# Patient Record
Sex: MALE | Race: WHITE | NOT HISPANIC OR LATINO | Employment: FULL TIME | URBAN - METROPOLITAN AREA
[De-identification: names, ages, dates, MRNs, and addresses within clinical notes are randomized per-mention and may not be internally consistent; named-entity substitution may affect disease eponyms.]

---

## 2019-01-16 ENCOUNTER — OFFICE VISIT CONVERTED (OUTPATIENT)
Dept: SURGERY | Facility: CLINIC | Age: 54
End: 2019-01-16
Attending: SURGERY

## 2019-01-18 ENCOUNTER — HOSPITAL ENCOUNTER (OUTPATIENT)
Dept: PERIOP | Facility: HOSPITAL | Age: 54
Setting detail: HOSPITAL OUTPATIENT SURGERY
Discharge: HOME | End: 2019-01-18
Attending: SURGERY

## 2019-01-31 ENCOUNTER — OFFICE VISIT CONVERTED (OUTPATIENT)
Dept: SURGERY | Facility: CLINIC | Age: 54
End: 2019-01-31
Attending: SURGERY

## 2019-05-18 ENCOUNTER — HOSPITAL ENCOUNTER (OUTPATIENT)
Dept: URGENT CARE | Facility: CLINIC | Age: 54
Discharge: HOME | End: 2019-05-18

## 2021-04-09 ENCOUNTER — HOSPITAL ENCOUNTER (OUTPATIENT)
Dept: GENERAL RADIOLOGY | Facility: HOSPITAL | Age: 56
Discharge: HOME | End: 2021-04-09
Attending: NURSE PRACTITIONER

## 2021-05-16 VITALS — RESPIRATION RATE: 14 BRPM | BODY MASS INDEX: 30.31 KG/M2 | WEIGHT: 200 LBS | HEIGHT: 68 IN

## 2021-05-16 VITALS — HEIGHT: 68 IN | RESPIRATION RATE: 16 BRPM | BODY MASS INDEX: 30.24 KG/M2 | WEIGHT: 199.56 LBS

## 2021-07-01 ENCOUNTER — HOSPITAL ENCOUNTER (OUTPATIENT)
Dept: GENERAL RADIOLOGY | Facility: HOSPITAL | Age: 56
Discharge: HOME | End: 2021-07-01
Admitting: NURSE PRACTITIONER

## 2021-07-01 ENCOUNTER — TRANSCRIBE ORDERS (OUTPATIENT)
Dept: GENERAL RADIOLOGY | Facility: HOSPITAL | Age: 56
End: 2021-07-01

## 2021-07-01 DIAGNOSIS — R10.9 STOMACH ACHE: ICD-10-CM

## 2021-07-01 DIAGNOSIS — R10.9 STOMACH ACHE: Primary | ICD-10-CM

## 2021-07-01 PROCEDURE — 74018 RADEX ABDOMEN 1 VIEW: CPT

## 2021-08-21 ENCOUNTER — APPOINTMENT (OUTPATIENT)
Dept: CT IMAGING | Facility: HOSPITAL | Age: 56
End: 2021-08-21

## 2021-08-21 ENCOUNTER — HOSPITAL ENCOUNTER (EMERGENCY)
Facility: HOSPITAL | Age: 56
Discharge: HOME | End: 2021-08-21
Attending: EMERGENCY MEDICINE | Admitting: EMERGENCY MEDICINE

## 2021-08-21 VITALS
WEIGHT: 202.38 LBS | OXYGEN SATURATION: 91 % | DIASTOLIC BLOOD PRESSURE: 63 MMHG | HEART RATE: 68 BPM | TEMPERATURE: 98.4 F | HEIGHT: 60 IN | BODY MASS INDEX: 39.73 KG/M2 | RESPIRATION RATE: 16 BRPM | SYSTOLIC BLOOD PRESSURE: 101 MMHG

## 2021-08-21 DIAGNOSIS — N23 RENAL COLIC: ICD-10-CM

## 2021-08-21 DIAGNOSIS — N20.1 RIGHT DISTAL URETERAL CALCULUS: Primary | ICD-10-CM

## 2021-08-21 LAB
ALBUMIN SERPL-MCNC: 4.6 G/DL (ref 3.5–5.2)
ALBUMIN/GLOB SERPL: 1.6 G/DL
ALP SERPL-CCNC: 82 U/L (ref 39–117)
ALT SERPL W P-5'-P-CCNC: 28 U/L (ref 1–41)
ANION GAP SERPL CALCULATED.3IONS-SCNC: 10.6 MMOL/L (ref 5–15)
AST SERPL-CCNC: 21 U/L (ref 1–40)
BACTERIA UR QL AUTO: ABNORMAL /HPF
BASOPHILS # BLD AUTO: 0.04 10*3/MM3 (ref 0–0.2)
BASOPHILS NFR BLD AUTO: 0.9 % (ref 0–1.5)
BILIRUB SERPL-MCNC: 0.8 MG/DL (ref 0–1.2)
BILIRUB UR QL STRIP: NEGATIVE
BUN SERPL-MCNC: 12 MG/DL (ref 6–20)
BUN/CREAT SERPL: 11.2 (ref 7–25)
CALCIUM SPEC-SCNC: 9.3 MG/DL (ref 8.6–10.5)
CHLORIDE SERPL-SCNC: 105 MMOL/L (ref 98–107)
CLARITY UR: CLEAR
CO2 SERPL-SCNC: 24.4 MMOL/L (ref 22–29)
COLOR UR: YELLOW
CREAT SERPL-MCNC: 1.07 MG/DL (ref 0.76–1.27)
DEPRECATED RDW RBC AUTO: 41.6 FL (ref 37–54)
EOSINOPHIL # BLD AUTO: 0.09 10*3/MM3 (ref 0–0.4)
EOSINOPHIL NFR BLD AUTO: 2 % (ref 0.3–6.2)
ERYTHROCYTE [DISTWIDTH] IN BLOOD BY AUTOMATED COUNT: 13.1 % (ref 12.3–15.4)
GFR SERPL CREATININE-BSD FRML MDRD: 71 ML/MIN/1.73
GLOBULIN UR ELPH-MCNC: 2.8 GM/DL
GLUCOSE SERPL-MCNC: 157 MG/DL (ref 65–99)
GLUCOSE UR STRIP-MCNC: NEGATIVE MG/DL
HCT VFR BLD AUTO: 45.7 % (ref 37.5–51)
HGB BLD-MCNC: 15.4 G/DL (ref 13–17.7)
HGB UR QL STRIP.AUTO: ABNORMAL
HOLD SPECIMEN: NORMAL
HOLD SPECIMEN: NORMAL
HYALINE CASTS UR QL AUTO: ABNORMAL /LPF
IMM GRANULOCYTES # BLD AUTO: 0.02 10*3/MM3 (ref 0–0.05)
IMM GRANULOCYTES NFR BLD AUTO: 0.4 % (ref 0–0.5)
KETONES UR QL STRIP: NEGATIVE
LEUKOCYTE ESTERASE UR QL STRIP.AUTO: NEGATIVE
LIPASE SERPL-CCNC: 41 U/L (ref 13–60)
LYMPHOCYTES # BLD AUTO: 1.75 10*3/MM3 (ref 0.7–3.1)
LYMPHOCYTES NFR BLD AUTO: 39.3 % (ref 19.6–45.3)
MCH RBC QN AUTO: 29.6 PG (ref 26.6–33)
MCHC RBC AUTO-ENTMCNC: 33.7 G/DL (ref 31.5–35.7)
MCV RBC AUTO: 87.7 FL (ref 79–97)
MONOCYTES # BLD AUTO: 0.28 10*3/MM3 (ref 0.1–0.9)
MONOCYTES NFR BLD AUTO: 6.3 % (ref 5–12)
NEUTROPHILS NFR BLD AUTO: 2.27 10*3/MM3 (ref 1.7–7)
NEUTROPHILS NFR BLD AUTO: 51.1 % (ref 42.7–76)
NITRITE UR QL STRIP: NEGATIVE
NRBC BLD AUTO-RTO: 0 /100 WBC (ref 0–0.2)
PH UR STRIP.AUTO: <=5 [PH] (ref 5–8)
PLATELET # BLD AUTO: 177 10*3/MM3 (ref 140–450)
PMV BLD AUTO: 9.8 FL (ref 6–12)
POTASSIUM SERPL-SCNC: 4.1 MMOL/L (ref 3.5–5.2)
PROT SERPL-MCNC: 7.4 G/DL (ref 6–8.5)
PROT UR QL STRIP: NEGATIVE
RBC # BLD AUTO: 5.21 10*6/MM3 (ref 4.14–5.8)
RBC # UR: ABNORMAL /HPF
REF LAB TEST METHOD: ABNORMAL
SODIUM SERPL-SCNC: 140 MMOL/L (ref 136–145)
SP GR UR STRIP: 1.01 (ref 1–1.03)
SQUAMOUS #/AREA URNS HPF: ABNORMAL /HPF
UROBILINOGEN UR QL STRIP: ABNORMAL
WBC # BLD AUTO: 4.45 10*3/MM3 (ref 3.4–10.8)
WBC UR QL AUTO: ABNORMAL /HPF
WHOLE BLOOD HOLD SPECIMEN: NORMAL

## 2021-08-21 PROCEDURE — 80053 COMPREHEN METABOLIC PANEL: CPT | Performed by: EMERGENCY MEDICINE

## 2021-08-21 PROCEDURE — 99283 EMERGENCY DEPT VISIT LOW MDM: CPT

## 2021-08-21 PROCEDURE — 74176 CT ABD & PELVIS W/O CONTRAST: CPT

## 2021-08-21 PROCEDURE — 96374 THER/PROPH/DIAG INJ IV PUSH: CPT

## 2021-08-21 PROCEDURE — 25010000002 KETOROLAC TROMETHAMINE PER 15 MG: Performed by: EMERGENCY MEDICINE

## 2021-08-21 PROCEDURE — 74176 CT ABD & PELVIS W/O CONTRAST: CPT | Performed by: RADIOLOGY

## 2021-08-21 PROCEDURE — 25010000002 ONDANSETRON PER 1 MG: Performed by: EMERGENCY MEDICINE

## 2021-08-21 PROCEDURE — 96375 TX/PRO/DX INJ NEW DRUG ADDON: CPT

## 2021-08-21 PROCEDURE — 83690 ASSAY OF LIPASE: CPT | Performed by: EMERGENCY MEDICINE

## 2021-08-21 PROCEDURE — 85025 COMPLETE CBC W/AUTO DIFF WBC: CPT

## 2021-08-21 PROCEDURE — 81001 URINALYSIS AUTO W/SCOPE: CPT | Performed by: EMERGENCY MEDICINE

## 2021-08-21 RX ORDER — SODIUM CHLORIDE 0.9 % (FLUSH) 0.9 %
10 SYRINGE (ML) INJECTION AS NEEDED
Status: DISCONTINUED | OUTPATIENT
Start: 2021-08-21 | End: 2021-08-21 | Stop reason: HOSPADM

## 2021-08-21 RX ORDER — KETOROLAC TROMETHAMINE 30 MG/ML
30 INJECTION, SOLUTION INTRAMUSCULAR; INTRAVENOUS ONCE
Status: COMPLETED | OUTPATIENT
Start: 2021-08-21 | End: 2021-08-21

## 2021-08-21 RX ORDER — TAMSULOSIN HYDROCHLORIDE 0.4 MG/1
1 CAPSULE ORAL DAILY
Qty: 15 CAPSULE | Refills: 0 | Status: SHIPPED | OUTPATIENT
Start: 2021-08-21

## 2021-08-21 RX ORDER — ONDANSETRON 2 MG/ML
4 INJECTION INTRAMUSCULAR; INTRAVENOUS ONCE
Status: COMPLETED | OUTPATIENT
Start: 2021-08-21 | End: 2021-08-21

## 2021-08-21 RX ORDER — ONDANSETRON 4 MG/1
4 TABLET, ORALLY DISINTEGRATING ORAL EVERY 8 HOURS PRN
Qty: 15 TABLET | Refills: 0 | Status: SHIPPED | OUTPATIENT
Start: 2021-08-21

## 2021-08-21 RX ORDER — ATORVASTATIN CALCIUM 20 MG/1
20 TABLET, FILM COATED ORAL NIGHTLY
COMMUNITY
End: 2022-12-16

## 2021-08-21 RX ORDER — HYDROCODONE BITARTRATE AND ACETAMINOPHEN 5; 325 MG/1; MG/1
1 TABLET ORAL EVERY 6 HOURS PRN
Qty: 15 TABLET | Refills: 0 | Status: SHIPPED | OUTPATIENT
Start: 2021-08-21

## 2021-08-21 RX ADMIN — SODIUM CHLORIDE 1000 ML: 9 INJECTION, SOLUTION INTRAVENOUS at 07:37

## 2021-08-21 RX ADMIN — KETOROLAC TROMETHAMINE 30 MG: 30 INJECTION, SOLUTION INTRAMUSCULAR; INTRAVENOUS at 07:38

## 2021-08-21 RX ADMIN — ONDANSETRON 4 MG: 2 INJECTION INTRAMUSCULAR; INTRAVENOUS at 07:38

## 2021-08-21 NOTE — DISCHARGE INSTRUCTIONS
Drink plenty of fluids.  Take ibuprofen over-the-counter as needed for mild pain.  Take other medications as directed.  Return for uncontrolled pain, persistent vomiting, fever or other signs of infection.  Strain your urine.  Follow-up with Dr. Agarwal in 2 days.

## 2021-08-21 NOTE — ED PROVIDER NOTES
Time: 7:47 AM EDT  Arrived by: private vehicle  Chief Complaint: abdominal pain  History provided by: patient  History is limited by: N/A    History of Present Illness:  Patient is a 56 y.o. year old male that presents to the emergency department with ABDOMINAL PAIN which began today. Patient reports pain began over the right side of his back and moved to his right flank. Patient reports it is hard to stay still due to pain.     Pt reports difficulty urinating. He also reports some nausea.     Patient has a history of kidney stones. He states his currently symptoms feel similar to prior kidney stones.     Abdominal surgeries include cholecystectomy and vasectomy.       Abdominal Pain  Pain location:  R flank  Pain severity:  Moderate  Duration:  1 day  Timing:  Constant  Worsened by:  Not moving  Associated symptoms: nausea    Associated symptoms: no chest pain, no chills, no cough, no diarrhea, no dysuria, no fever, no shortness of breath and no vomiting            Patient Care Team  Primary Care Provider: Amena Rosa APRN      Past Medical History:     No Known Allergies  Past Medical History:   Diagnosis Date   • Hyperlipidemia    • Kidney stones      Past Surgical History:   Procedure Laterality Date   • CHOLECYSTECTOMY     • HAND SURGERY     • VASECTOMY       History reviewed. No pertinent family history.    Home Medications:  Prior to Admission medications    Medication Sig Start Date End Date Taking? Authorizing Provider   atorvastatin (LIPITOR) 20 MG tablet Take 20 mg by mouth Daily.   Yes Provider, Historical, MD        Social History:   PT  reports that he has never smoked. He does not have any smokeless tobacco history on file. He reports current alcohol use. He reports that he does not use drugs.    Record Review:  I have reviewed the patient's records in [employer].     Review of Systems  Review of Systems   Constitutional: Negative for chills and fever.   HENT: Negative for ear discharge.    Eyes:  "Negative for photophobia.   Respiratory: Negative for cough and shortness of breath.    Cardiovascular: Negative for chest pain.   Gastrointestinal: Positive for abdominal pain and nausea. Negative for diarrhea and vomiting.   Genitourinary: Positive for difficulty urinating and flank pain. Negative for dysuria.   Musculoskeletal: Negative for back pain and neck pain.   Skin: Negative for rash.   Neurological: Negative for headaches.   All other systems reviewed and are negative.       Physical Exam  /63   Pulse 68   Temp 98.4 °F (36.9 °C) (Oral)   Resp 16   Ht 148.6 cm (58.5\")   Wt 91.8 kg (202 lb 6.1 oz)   SpO2 91%   BMI 41.58 kg/m²     Physical Exam  Vitals and nursing note reviewed.   Constitutional:       General: He is not in acute distress.  HENT:      Head: Normocephalic and atraumatic.   Cardiovascular:      Rate and Rhythm: Normal rate and regular rhythm.      Heart sounds: No murmur heard.     Pulmonary:      Effort: No respiratory distress.      Breath sounds: Normal breath sounds.   Abdominal:      Palpations: Abdomen is soft.      Tenderness: There is no abdominal tenderness.   Musculoskeletal:      Cervical back: Neck supple.   Skin:     General: Skin is warm and dry.      Findings: No rash.   Neurological:      General: No focal deficit present.      Mental Status: He is alert and oriented to person, place, and time.                  ED Course  /63   Pulse 68   Temp 98.4 °F (36.9 °C) (Oral)   Resp 16   Ht 148.6 cm (58.5\")   Wt 91.8 kg (202 lb 6.1 oz)   SpO2 91%   BMI 41.58 kg/m²   Results for orders placed or performed during the hospital encounter of 08/21/21   Comprehensive Metabolic Panel    Specimen: Blood   Result Value Ref Range    Glucose 157 (H) 65 - 99 mg/dL    BUN 12 6 - 20 mg/dL    Creatinine 1.07 0.76 - 1.27 mg/dL    Sodium 140 136 - 145 mmol/L    Potassium 4.1 3.5 - 5.2 mmol/L    Chloride 105 98 - 107 mmol/L    CO2 24.4 22.0 - 29.0 mmol/L    Calcium 9.3 8.6 - " 10.5 mg/dL    Total Protein 7.4 6.0 - 8.5 g/dL    Albumin 4.60 3.50 - 5.20 g/dL    ALT (SGPT) 28 1 - 41 U/L    AST (SGOT) 21 1 - 40 U/L    Alkaline Phosphatase 82 39 - 117 U/L    Total Bilirubin 0.8 0.0 - 1.2 mg/dL    eGFR Non African Amer 71 >60 mL/min/1.73    Globulin 2.8 gm/dL    A/G Ratio 1.6 g/dL    BUN/Creatinine Ratio 11.2 7.0 - 25.0    Anion Gap 10.6 5.0 - 15.0 mmol/L   Lipase    Specimen: Blood   Result Value Ref Range    Lipase 41 13 - 60 U/L   Urinalysis With Microscopic If Indicated (No Culture) - Urine, Clean Catch    Specimen: Urine, Clean Catch   Result Value Ref Range    Color, UA Yellow Yellow, Straw    Appearance, UA Clear Clear    pH, UA <=5.0 5.0 - 8.0    Specific Gravity, UA 1.015 1.005 - 1.030    Glucose, UA Negative Negative    Ketones, UA Negative Negative    Bilirubin, UA Negative Negative    Blood, UA Small (1+) (A) Negative    Protein, UA Negative Negative    Leuk Esterase, UA Negative Negative    Nitrite, UA Negative Negative    Urobilinogen, UA 1.0 E.U./dL 0.2 - 1.0 E.U./dL   CBC Auto Differential    Specimen: Blood   Result Value Ref Range    WBC 4.45 3.40 - 10.80 10*3/mm3    RBC 5.21 4.14 - 5.80 10*6/mm3    Hemoglobin 15.4 13.0 - 17.7 g/dL    Hematocrit 45.7 37.5 - 51.0 %    MCV 87.7 79.0 - 97.0 fL    MCH 29.6 26.6 - 33.0 pg    MCHC 33.7 31.5 - 35.7 g/dL    RDW 13.1 12.3 - 15.4 %    RDW-SD 41.6 37.0 - 54.0 fl    MPV 9.8 6.0 - 12.0 fL    Platelets 177 140 - 450 10*3/mm3    Neutrophil % 51.1 42.7 - 76.0 %    Lymphocyte % 39.3 19.6 - 45.3 %    Monocyte % 6.3 5.0 - 12.0 %    Eosinophil % 2.0 0.3 - 6.2 %    Basophil % 0.9 0.0 - 1.5 %    Immature Grans % 0.4 0.0 - 0.5 %    Neutrophils, Absolute 2.27 1.70 - 7.00 10*3/mm3    Lymphocytes, Absolute 1.75 0.70 - 3.10 10*3/mm3    Monocytes, Absolute 0.28 0.10 - 0.90 10*3/mm3    Eosinophils, Absolute 0.09 0.00 - 0.40 10*3/mm3    Basophils, Absolute 0.04 0.00 - 0.20 10*3/mm3    Immature Grans, Absolute 0.02 0.00 - 0.05 10*3/mm3    nRBC 0.0 0.0 - 0.2  /100 WBC   Urinalysis, Microscopic Only - Urine, Clean Catch    Specimen: Urine, Clean Catch   Result Value Ref Range    RBC, UA 6-12 (A) None Seen /HPF    WBC, UA 3-5 (A) None Seen /HPF    Bacteria, UA None Seen None Seen /HPF    Squamous Epithelial Cells, UA 0-2 None Seen, 0-2 /HPF    Hyaline Casts, UA 0-2 None Seen /LPF    Methodology Automated Microscopy    Green Top (Gel)   Result Value Ref Range    Extra Tube Hold for add-ons.    Lavender Top   Result Value Ref Range    Extra Tube hold for add-on    Gold Top - SST   Result Value Ref Range    Extra Tube Hold for add-ons.      Medications   sodium chloride 0.9 % bolus 1,000 mL (0 mL Intravenous Stopped 8/21/21 0906)   ketorolac (TORADOL) injection 30 mg (30 mg Intravenous Given 8/21/21 0738)   ondansetron (ZOFRAN) injection 4 mg (4 mg Intravenous Given 8/21/21 0738)     CT Abdomen Pelvis Without Contrast    Result Date: 8/21/2021  Narrative: PROCEDURE: CT ABDOMEN PELVIS WO CONTRAST  COMPARISON: None  INDICATIONS: ABDOMINAL PAIN  TECHNIQUE: CT images were created without intravenous contrast.   PROTOCOL:   Standard imaging protocol performed    RADIATION:   DLP: 784 mGy*cm   Automated exposure control was utilized to minimize radiation dose.  FINDINGS:  The lung bases are clear bilaterally.  No effusion is seen.  A cholecystectomy has been performed.  The liver, spleen, pancreas and adrenal glands appear unremarkable.  At the level of the mid pelvis, there is a 0.7 cm stone in the right ureter.  This produces mild to moderate hydronephrosis.  Both kidneys otherwise appear unremarkable.  A small fat containing inguinal hernia is noted on the left.  No adenopathy or free fluid is seen in the abdomen or pelvis.  Mild prostate enlargement is noted.  Seminal vesicles and urinary bladder appear normal.  No acute bowel abnormality is seen.  The appendix appears normal.  The lack of oral contrast limits evaluation of the bowel.  No focal osseous lesion is identified.   CONCLUSION:  1. 0.7 cm stone in the distal right ureter producing mild to moderate hydronephrosis 2. Previous cholecystectomy 3. Small fat containing left inguinal hernia 4. Mild prostatomegaly     Paul Ibarra M.D.       Electronically Signed and Approved By: Paul Ibarra M.D. on 8/21/2021 at 8:56             09:18 EDT: Pt updated with results and treatment plan.     Procedures/EKGs:  Procedures        Medical Decision Making:                     MDM     Final diagnoses:   Right distal ureteral calculus   Renal colic        Disposition:  ED Disposition     ED Disposition Condition Comment    Discharge Stable           Documentation assistance provided by Jessie Alatorre acting as scribe for Dr. Miguel Luciano. Information recorded by the scribe was done at my direction and has been verified and validated by me.          Jessie Alatorre  08/21/21 0919       Miguel Luciano DO  08/22/21 7213

## 2021-08-26 ENCOUNTER — TELEPHONE (OUTPATIENT)
Dept: UROLOGY | Facility: CLINIC | Age: 56
End: 2021-08-26

## 2021-08-26 ENCOUNTER — HOSPITAL ENCOUNTER (EMERGENCY)
Facility: HOSPITAL | Age: 56
Discharge: HOME | End: 2021-08-26
Attending: EMERGENCY MEDICINE | Admitting: EMERGENCY MEDICINE

## 2021-08-26 ENCOUNTER — APPOINTMENT (OUTPATIENT)
Dept: GENERAL RADIOLOGY | Facility: HOSPITAL | Age: 56
End: 2021-08-26

## 2021-08-26 VITALS
HEART RATE: 84 BPM | DIASTOLIC BLOOD PRESSURE: 91 MMHG | TEMPERATURE: 98 F | BODY MASS INDEX: 30.37 KG/M2 | SYSTOLIC BLOOD PRESSURE: 131 MMHG | OXYGEN SATURATION: 95 % | RESPIRATION RATE: 20 BRPM | WEIGHT: 200.4 LBS | HEIGHT: 68 IN

## 2021-08-26 DIAGNOSIS — N20.0 KIDNEY STONE: Primary | ICD-10-CM

## 2021-08-26 LAB
ALBUMIN SERPL-MCNC: 5 G/DL (ref 3.5–5.2)
ALBUMIN/GLOB SERPL: 1.9 G/DL
ALP SERPL-CCNC: 75 U/L (ref 39–117)
ALT SERPL W P-5'-P-CCNC: 30 U/L (ref 1–41)
ANION GAP SERPL CALCULATED.3IONS-SCNC: 9.4 MMOL/L (ref 5–15)
AST SERPL-CCNC: 23 U/L (ref 1–40)
BACTERIA UR QL AUTO: ABNORMAL /HPF
BASOPHILS # BLD AUTO: 0.04 10*3/MM3 (ref 0–0.2)
BASOPHILS NFR BLD AUTO: 0.6 % (ref 0–1.5)
BILIRUB SERPL-MCNC: 0.8 MG/DL (ref 0–1.2)
BILIRUB UR QL STRIP: NEGATIVE
BUN SERPL-MCNC: 15 MG/DL (ref 6–20)
BUN/CREAT SERPL: 10.9 (ref 7–25)
CALCIUM SPEC-SCNC: 9.5 MG/DL (ref 8.6–10.5)
CHLORIDE SERPL-SCNC: 104 MMOL/L (ref 98–107)
CLARITY UR: CLEAR
CO2 SERPL-SCNC: 27.6 MMOL/L (ref 22–29)
COLOR UR: YELLOW
CREAT SERPL-MCNC: 1.37 MG/DL (ref 0.76–1.27)
DEPRECATED RDW RBC AUTO: 41.2 FL (ref 37–54)
EOSINOPHIL # BLD AUTO: 0.08 10*3/MM3 (ref 0–0.4)
EOSINOPHIL NFR BLD AUTO: 1.1 % (ref 0.3–6.2)
ERYTHROCYTE [DISTWIDTH] IN BLOOD BY AUTOMATED COUNT: 12.6 % (ref 12.3–15.4)
GFR SERPL CREATININE-BSD FRML MDRD: 54 ML/MIN/1.73
GLOBULIN UR ELPH-MCNC: 2.7 GM/DL
GLUCOSE SERPL-MCNC: 97 MG/DL (ref 65–99)
GLUCOSE UR STRIP-MCNC: NEGATIVE MG/DL
HCT VFR BLD AUTO: 45.7 % (ref 37.5–51)
HGB BLD-MCNC: 15.5 G/DL (ref 13–17.7)
HGB UR QL STRIP.AUTO: ABNORMAL
HOLD SPECIMEN: NORMAL
HOLD SPECIMEN: NORMAL
HYALINE CASTS UR QL AUTO: ABNORMAL /LPF
IMM GRANULOCYTES # BLD AUTO: 0.03 10*3/MM3 (ref 0–0.05)
IMM GRANULOCYTES NFR BLD AUTO: 0.4 % (ref 0–0.5)
KETONES UR QL STRIP: NEGATIVE
LEUKOCYTE ESTERASE UR QL STRIP.AUTO: NEGATIVE
LIPASE SERPL-CCNC: 43 U/L (ref 13–60)
LYMPHOCYTES # BLD AUTO: 1.55 10*3/MM3 (ref 0.7–3.1)
LYMPHOCYTES NFR BLD AUTO: 21.4 % (ref 19.6–45.3)
MCH RBC QN AUTO: 30.1 PG (ref 26.6–33)
MCHC RBC AUTO-ENTMCNC: 33.9 G/DL (ref 31.5–35.7)
MCV RBC AUTO: 88.7 FL (ref 79–97)
MONOCYTES # BLD AUTO: 0.62 10*3/MM3 (ref 0.1–0.9)
MONOCYTES NFR BLD AUTO: 8.6 % (ref 5–12)
NEUTROPHILS NFR BLD AUTO: 4.91 10*3/MM3 (ref 1.7–7)
NEUTROPHILS NFR BLD AUTO: 67.9 % (ref 42.7–76)
NITRITE UR QL STRIP: NEGATIVE
NRBC BLD AUTO-RTO: 0 /100 WBC (ref 0–0.2)
PH UR STRIP.AUTO: <=5 [PH] (ref 5–8)
PLATELET # BLD AUTO: 192 10*3/MM3 (ref 140–450)
PMV BLD AUTO: 10 FL (ref 6–12)
POTASSIUM SERPL-SCNC: 4.2 MMOL/L (ref 3.5–5.2)
PROT SERPL-MCNC: 7.7 G/DL (ref 6–8.5)
PROT UR QL STRIP: NEGATIVE
RBC # BLD AUTO: 5.15 10*6/MM3 (ref 4.14–5.8)
RBC # UR: ABNORMAL /HPF
REF LAB TEST METHOD: ABNORMAL
SODIUM SERPL-SCNC: 141 MMOL/L (ref 136–145)
SP GR UR STRIP: 1.02 (ref 1–1.03)
SQUAMOUS #/AREA URNS HPF: ABNORMAL /HPF
UROBILINOGEN UR QL STRIP: ABNORMAL
WBC # BLD AUTO: 7.23 10*3/MM3 (ref 3.4–10.8)
WBC UR QL AUTO: ABNORMAL /HPF
WHOLE BLOOD HOLD SPECIMEN: NORMAL

## 2021-08-26 PROCEDURE — 99283 EMERGENCY DEPT VISIT LOW MDM: CPT

## 2021-08-26 PROCEDURE — 51798 US URINE CAPACITY MEASURE: CPT

## 2021-08-26 PROCEDURE — 74018 RADEX ABDOMEN 1 VIEW: CPT

## 2021-08-26 PROCEDURE — 96375 TX/PRO/DX INJ NEW DRUG ADDON: CPT

## 2021-08-26 PROCEDURE — 96374 THER/PROPH/DIAG INJ IV PUSH: CPT

## 2021-08-26 PROCEDURE — 81001 URINALYSIS AUTO W/SCOPE: CPT | Performed by: EMERGENCY MEDICINE

## 2021-08-26 PROCEDURE — 80053 COMPREHEN METABOLIC PANEL: CPT

## 2021-08-26 PROCEDURE — 25010000002 KETOROLAC TROMETHAMINE PER 15 MG

## 2021-08-26 PROCEDURE — 25010000002 ONDANSETRON PER 1 MG

## 2021-08-26 PROCEDURE — 25010000002 MORPHINE PER 10 MG: Performed by: EMERGENCY MEDICINE

## 2021-08-26 PROCEDURE — 83690 ASSAY OF LIPASE: CPT

## 2021-08-26 PROCEDURE — 85025 COMPLETE CBC W/AUTO DIFF WBC: CPT

## 2021-08-26 RX ORDER — ONDANSETRON 2 MG/ML
4 INJECTION INTRAMUSCULAR; INTRAVENOUS ONCE
Status: COMPLETED | OUTPATIENT
Start: 2021-08-26 | End: 2021-08-26

## 2021-08-26 RX ORDER — SODIUM CHLORIDE 0.9 % (FLUSH) 0.9 %
10 SYRINGE (ML) INJECTION AS NEEDED
Status: DISCONTINUED | OUTPATIENT
Start: 2021-08-26 | End: 2021-08-27 | Stop reason: HOSPADM

## 2021-08-26 RX ORDER — KETOROLAC TROMETHAMINE 10 MG/1
10 TABLET, FILM COATED ORAL EVERY 6 HOURS PRN
Qty: 12 TABLET | Refills: 0 | Status: SHIPPED | OUTPATIENT
Start: 2021-08-26

## 2021-08-26 RX ORDER — KETOROLAC TROMETHAMINE 30 MG/ML
30 INJECTION, SOLUTION INTRAMUSCULAR; INTRAVENOUS ONCE
Status: COMPLETED | OUTPATIENT
Start: 2021-08-26 | End: 2021-08-26

## 2021-08-26 RX ADMIN — KETOROLAC TROMETHAMINE 30 MG: 30 INJECTION, SOLUTION INTRAMUSCULAR; INTRAVENOUS at 23:29

## 2021-08-26 RX ADMIN — MORPHINE SULFATE 4 MG: 4 INJECTION, SOLUTION INTRAMUSCULAR; INTRAVENOUS at 20:23

## 2021-08-26 RX ADMIN — SODIUM CHLORIDE 1000 ML: 9 INJECTION, SOLUTION INTRAVENOUS at 20:26

## 2021-08-26 RX ADMIN — ONDANSETRON 4 MG: 2 INJECTION INTRAMUSCULAR; INTRAVENOUS at 20:26

## 2021-08-26 NOTE — TELEPHONE ENCOUNTER
Jazmín called for stat appointment for patient.  ER 08/21. Has 0.7 cm stone in ureter with hydronephrosis.  Patient having pain and pressure, and urinating little.  I showed the CT to Kacie Tran.  She said the stone will not pass, and best idea is for patient to go to ER.  I relayed this message to Jazmín.

## 2021-08-26 NOTE — ED PROVIDER NOTES
Time: 01:40 EDT  Arrived by: CHANCE  Chief Complaint: Right lower back pain and difficulty urinating  History provided by: Patient  History is limited by: N/A    History of Present Illness:  Patient is a 56 y.o. year old male that presents to the emergency department with right lower back pain difficulty urinating.  Patient has a known kidney stone.    HPI    Patient's primary care physician's office attempted to schedule patient an appointment for today without success.  It is reported in a separate note within the patient's chart that the CT was shown to Kacie Tran and she advised patient should return to the ER because the stone will not pass.         Similar Symptoms Previously: Yes  Recently seen: Yes.  Patient was seen here previously on August 21.      Patient Care Team  Primary Care Provider: Yes patient has establish care with a primary care provider.    Past Medical History:     No Known Allergies  Past Medical History:   Diagnosis Date   • Hyperlipidemia    • Kidney stones      Past Surgical History:   Procedure Laterality Date   • CHOLECYSTECTOMY     • HAND SURGERY     • VASECTOMY       History reviewed. No pertinent family history.    Home Medications:  Prior to Admission medications    Medication Sig Start Date End Date Taking? Authorizing Provider   atorvastatin (LIPITOR) 20 MG tablet Take 20 mg by mouth Daily.    Provider, MD Rita   HYDROcodone-acetaminophen (NORCO) 5-325 MG per tablet Take 1 tablet by mouth Every 6 (Six) Hours As Needed (Pain). 8/21/21   Miguel Luciano DO   ondansetron ODT (ZOFRAN-ODT) 4 MG disintegrating tablet Place 1 tablet on the tongue Every 8 (Eight) Hours As Needed for Nausea or Vomiting. 8/21/21   Miguel Luciano DO   tamsulosin (FLOMAX) 0.4 MG capsule 24 hr capsule Take 1 capsule by mouth Daily. 8/21/21   Miguel Luciano DO        Social History:   PT  reports that he has never smoked. He does not have any smokeless tobacco history on file. He reports  "current alcohol use. He reports that he does not use drugs.    Record Review:  I have reviewed the patient's records in [facility].     Review of Systems  Review of Systems   Constitutional: Negative for chills and fever.   HENT: Negative for congestion, ear pain and sore throat.    Eyes: Negative for pain.   Respiratory: Negative for cough, chest tightness and shortness of breath.    Cardiovascular: Negative for chest pain.   Gastrointestinal: Negative for abdominal pain, diarrhea, nausea and vomiting.   Genitourinary: Positive for difficulty urinating and flank pain. Negative for hematuria.   Musculoskeletal: Negative for joint swelling.   Skin: Negative for pallor.   Neurological: Negative for seizures and headaches.   All other systems reviewed and are negative.       Physical Exam  /91   Pulse 84   Temp 98 °F (36.7 °C)   Resp 20   Ht 172.7 cm (68\")   Wt 90.9 kg (200 lb 6.4 oz)   SpO2 95%   BMI 30.47 kg/m²     Physical Exam  Vitals and nursing note reviewed.   Constitutional:       General: He is not in acute distress.     Appearance: Normal appearance. He is not toxic-appearing.   HENT:      Head: Normocephalic and atraumatic.      Mouth/Throat:      Mouth: Mucous membranes are moist.   Eyes:      Extraocular Movements: Extraocular movements intact.      Pupils: Pupils are equal, round, and reactive to light.   Cardiovascular:      Rate and Rhythm: Normal rate and regular rhythm.      Pulses: Normal pulses.      Heart sounds: Normal heart sounds.   Pulmonary:      Effort: Pulmonary effort is normal. No respiratory distress.      Breath sounds: Normal breath sounds.   Abdominal:      General: Abdomen is flat. There is distension.      Palpations: Abdomen is soft.      Tenderness: There is no abdominal tenderness.      Comments: Patient is slightly distended and suprapubic area in region of bladder.   Musculoskeletal:         General: Normal range of motion.      Cervical back: Normal range of motion and " "neck supple.   Skin:     General: Skin is warm and dry.   Neurological:      Mental Status: He is alert and oriented to person, place, and time. Mental status is at baseline.   Psychiatric:         Mood and Affect: Mood normal.         Behavior: Behavior normal.         Thought Content: Thought content normal.         Judgment: Judgment normal.        Patient is a 56-year-old male who is awake alert and oriented in no acute distress however he does appear to be uncomfortable.  Breath sounds are equal on auscultation and skin is pink warm and dry.  Patient is tender to right lower back and flank area.  Patient states the last time he urinated was Tuesday and that it was only a dribble.  He denies any visible blood to the urine.  He describes the urine to be dark in nature.  All other bodily systems are WNL.          ED Course  /91   Pulse 84   Temp 98 °F (36.7 °C)   Resp 20   Ht 172.7 cm (68\")   Wt 90.9 kg (200 lb 6.4 oz)   SpO2 95%   BMI 30.47 kg/m²   Results for orders placed or performed during the hospital encounter of 08/26/21   Comprehensive Metabolic Panel    Specimen: Blood   Result Value Ref Range    Glucose 97 65 - 99 mg/dL    BUN 15 6 - 20 mg/dL    Creatinine 1.37 (H) 0.76 - 1.27 mg/dL    Sodium 141 136 - 145 mmol/L    Potassium 4.2 3.5 - 5.2 mmol/L    Chloride 104 98 - 107 mmol/L    CO2 27.6 22.0 - 29.0 mmol/L    Calcium 9.5 8.6 - 10.5 mg/dL    Total Protein 7.7 6.0 - 8.5 g/dL    Albumin 5.00 3.50 - 5.20 g/dL    ALT (SGPT) 30 1 - 41 U/L    AST (SGOT) 23 1 - 40 U/L    Alkaline Phosphatase 75 39 - 117 U/L    Total Bilirubin 0.8 0.0 - 1.2 mg/dL    eGFR Non African Amer 54 (L) >60 mL/min/1.73    Globulin 2.7 gm/dL    A/G Ratio 1.9 g/dL    BUN/Creatinine Ratio 10.9 7.0 - 25.0    Anion Gap 9.4 5.0 - 15.0 mmol/L   Lipase    Specimen: Blood   Result Value Ref Range    Lipase 43 13 - 60 U/L   Urinalysis With Microscopic If Indicated (No Culture) - Urine, Clean Catch    Specimen: Urine, Clean Catch "   Result Value Ref Range    Color, UA Yellow Yellow, Straw    Appearance, UA Clear Clear    pH, UA <=5.0 5.0 - 8.0    Specific Gravity, UA 1.021 1.005 - 1.030    Glucose, UA Negative Negative    Ketones, UA Negative Negative    Bilirubin, UA Negative Negative    Blood, UA Moderate (2+) (A) Negative    Protein, UA Negative Negative    Leuk Esterase, UA Negative Negative    Nitrite, UA Negative Negative    Urobilinogen, UA 1.0 E.U./dL 0.2 - 1.0 E.U./dL   CBC Auto Differential    Specimen: Blood   Result Value Ref Range    WBC 7.23 3.40 - 10.80 10*3/mm3    RBC 5.15 4.14 - 5.80 10*6/mm3    Hemoglobin 15.5 13.0 - 17.7 g/dL    Hematocrit 45.7 37.5 - 51.0 %    MCV 88.7 79.0 - 97.0 fL    MCH 30.1 26.6 - 33.0 pg    MCHC 33.9 31.5 - 35.7 g/dL    RDW 12.6 12.3 - 15.4 %    RDW-SD 41.2 37.0 - 54.0 fl    MPV 10.0 6.0 - 12.0 fL    Platelets 192 140 - 450 10*3/mm3    Neutrophil % 67.9 42.7 - 76.0 %    Lymphocyte % 21.4 19.6 - 45.3 %    Monocyte % 8.6 5.0 - 12.0 %    Eosinophil % 1.1 0.3 - 6.2 %    Basophil % 0.6 0.0 - 1.5 %    Immature Grans % 0.4 0.0 - 0.5 %    Neutrophils, Absolute 4.91 1.70 - 7.00 10*3/mm3    Lymphocytes, Absolute 1.55 0.70 - 3.10 10*3/mm3    Monocytes, Absolute 0.62 0.10 - 0.90 10*3/mm3    Eosinophils, Absolute 0.08 0.00 - 0.40 10*3/mm3    Basophils, Absolute 0.04 0.00 - 0.20 10*3/mm3    Immature Grans, Absolute 0.03 0.00 - 0.05 10*3/mm3    nRBC 0.0 0.0 - 0.2 /100 WBC   Urinalysis, Microscopic Only - Urine, Clean Catch    Specimen: Urine, Clean Catch   Result Value Ref Range    RBC, UA 21-30 (A) None Seen /HPF    WBC, UA 3-5 (A) None Seen /HPF    Bacteria, UA None Seen None Seen /HPF    Squamous Epithelial Cells, UA 0-2 None Seen, 0-2 /HPF    Hyaline Casts, UA 0-2 None Seen /LPF    Methodology Automated Microscopy    Green Top (Gel)   Result Value Ref Range    Extra Tube Hold for add-ons.    Lavender Top   Result Value Ref Range    Extra Tube hold for add-on    Gold Top - SST   Result Value Ref Range    Extra  Tube Hold for add-ons.      Medications   sodium chloride 0.9 % flush 10 mL (has no administration in time range)   sodium chloride 0.9 % bolus 1,000 mL (0 mL Intravenous Stopped 8/26/21 2351)   ondansetron (ZOFRAN) injection 4 mg (4 mg Intravenous Given 8/26/21 2026)   morphine injection 4 mg (4 mg Intravenous Given 8/26/21 2023)   ketorolac (TORADOL) injection 30 mg (30 mg Intravenous Given 8/26/21 2329)     CT Abdomen Pelvis Without Contrast    Result Date: 8/21/2021  Narrative: PROCEDURE: CT ABDOMEN PELVIS WO CONTRAST  COMPARISON: None  INDICATIONS: ABDOMINAL PAIN  TECHNIQUE: CT images were created without intravenous contrast.   PROTOCOL:   Standard imaging protocol performed    RADIATION:   DLP: 784 mGy*cm   Automated exposure control was utilized to minimize radiation dose.  FINDINGS:  The lung bases are clear bilaterally.  No effusion is seen.  A cholecystectomy has been performed.  The liver, spleen, pancreas and adrenal glands appear unremarkable.  At the level of the mid pelvis, there is a 0.7 cm stone in the right ureter.  This produces mild to moderate hydronephrosis.  Both kidneys otherwise appear unremarkable.  A small fat containing inguinal hernia is noted on the left.  No adenopathy or free fluid is seen in the abdomen or pelvis.  Mild prostate enlargement is noted.  Seminal vesicles and urinary bladder appear normal.  No acute bowel abnormality is seen.  The appendix appears normal.  The lack of oral contrast limits evaluation of the bowel.  No focal osseous lesion is identified.  CONCLUSION:  1. 0.7 cm stone in the distal right ureter producing mild to moderate hydronephrosis 2. Previous cholecystectomy 3. Small fat containing left inguinal hernia 4. Mild prostatomegaly     Paul Ibarra M.D.       Electronically Signed and Approved By: Paul Ibarra M.D. on 8/21/2021 at 8:56             XR Abdomen KUB    Result Date: 8/26/2021  Narrative: PROCEDURE: XR ABDOMEN KUB  COMPARISON: 7/1/2021.   INDICATIONS: right side kidney/ureteral stone  FINDINGS: Two AP supine views of the abdomen and pelvis reveal pelvic phleboliths and a nonobstructive bowel gas pattern.  A distal obstructing right-sided ureteral calculus is possible, measuring at least 5 mm.  Bowel gas obscures the right kidney.  Formed stool obscures the left kidney.  No definite nonobstructing nephrolithiasis is appreciated radiographically.  The patient has undergone cholecystectomy.   CONCLUSION: There is a suspected distal right ureteral stone, measuring at least 5 mm in diameter, located at the expected ureterovesical junction (UVJ) or just upstream from the right UVJ.      RAHEL SHARP JR, MD       Electronically Signed and Approved By: RAHEL SHARP JR, MD on 8/26/2021 at 21:05               Procedures/EKGs:  Procedures            Medical Decision Making:                     ED Course as of Aug 27 0140   Thu Aug 26, 2021   2135 KUB resulted - Consulting urology at this time    [MS]   2150 Spoke with Dr. Agarwal and we would like pt to be bladder scanned for amount of retention and asked if the pt could see him at 0800 on the AM. Order placed for bladder to be scanned.     [MS]   2202 Bladder scan indicated approximately 300cc of urine prior to voided and 0 post void. RN strained urine and no sediment or stone was found.     [MS]   2307 Pt was updated with plan and  notified that Dr. Agarwal would see him in his office tomorrow morning.     [MS]      ED Course User Index  [MS] Hilaria Motta, APRN       MDM  Number of Diagnoses or Management Options     Amount and/or Complexity of Data Reviewed  Clinical lab tests: reviewed and ordered  Tests in the radiology section of CPT®: reviewed and ordered  Review and summarize past medical records: yes    Risk of Complications, Morbidity, and/or Mortality  Presenting problems: low  Diagnostic procedures: low  Management options: low    Patient Progress  Patient progress: stable       Final  diagnoses:   Kidney stone        Disposition:  ED Disposition     ED Disposition Condition Comment    Discharge Stable             This medical record created using voice recognition software and may contain unintended errors.      Hilaria Motta, APRN  08/27/21 0141

## 2021-08-27 ENCOUNTER — OFFICE VISIT (OUTPATIENT)
Dept: UROLOGY | Facility: CLINIC | Age: 56
End: 2021-08-27

## 2021-08-27 ENCOUNTER — PREP FOR SURGERY (OUTPATIENT)
Dept: OTHER | Facility: HOSPITAL | Age: 56
End: 2021-08-27

## 2021-08-27 ENCOUNTER — LAB (OUTPATIENT)
Dept: LAB | Facility: HOSPITAL | Age: 56
End: 2021-08-27

## 2021-08-27 VITALS — BODY MASS INDEX: 30.25 KG/M2 | WEIGHT: 199.6 LBS | HEIGHT: 68 IN

## 2021-08-27 DIAGNOSIS — N20.1 URETERAL STONE: ICD-10-CM

## 2021-08-27 DIAGNOSIS — N40.1 BENIGN PROSTATIC HYPERPLASIA (BPH) WITH STRAINING ON URINATION: Primary | ICD-10-CM

## 2021-08-27 DIAGNOSIS — N20.1 URETERAL STONE: Primary | ICD-10-CM

## 2021-08-27 DIAGNOSIS — R39.16 BENIGN PROSTATIC HYPERPLASIA (BPH) WITH STRAINING ON URINATION: Primary | ICD-10-CM

## 2021-08-27 PROBLEM — N20.0 NEPHROLITHIASIS: Status: ACTIVE | Noted: 2021-08-27

## 2021-08-27 PROCEDURE — 99203 OFFICE O/P NEW LOW 30 MIN: CPT | Performed by: UROLOGY

## 2021-08-27 PROCEDURE — C9803 HOPD COVID-19 SPEC COLLECT: HCPCS

## 2021-08-27 PROCEDURE — U0003 INFECTIOUS AGENT DETECTION BY NUCLEIC ACID (DNA OR RNA); SEVERE ACUTE RESPIRATORY SYNDROME CORONAVIRUS 2 (SARS-COV-2) (CORONAVIRUS DISEASE [COVID-19]), AMPLIFIED PROBE TECHNIQUE, MAKING USE OF HIGH THROUGHPUT TECHNOLOGIES AS DESCRIBED BY CMS-2020-01-R: HCPCS

## 2021-08-27 RX ORDER — SODIUM CHLORIDE 0.9 % (FLUSH) 0.9 %
10 SYRINGE (ML) INJECTION AS NEEDED
Status: CANCELLED | OUTPATIENT
Start: 2021-08-27

## 2021-08-27 RX ORDER — LEVOFLOXACIN 5 MG/ML
500 INJECTION, SOLUTION INTRAVENOUS ONCE
Status: CANCELLED | OUTPATIENT
Start: 2021-08-27 | End: 2021-08-27

## 2021-08-27 RX ORDER — SODIUM CHLORIDE 9 MG/ML
100 INJECTION, SOLUTION INTRAVENOUS CONTINUOUS
Status: CANCELLED | OUTPATIENT
Start: 2021-08-27

## 2021-08-27 RX ORDER — SODIUM CHLORIDE 0.9 % (FLUSH) 0.9 %
3 SYRINGE (ML) INJECTION EVERY 12 HOURS SCHEDULED
Status: CANCELLED | OUTPATIENT
Start: 2021-08-27

## 2021-08-27 RX ORDER — ERGOCALCIFEROL (VITAMIN D2) 10 MCG
400 TABLET ORAL DAILY
COMMUNITY

## 2021-08-27 RX ORDER — THIAMINE HCL 100 MG
5000 TABLET ORAL DAILY
COMMUNITY

## 2021-08-27 NOTE — H&P
Chief Complaint: Urologic complaint    Subjective         History of Present Illness  Adolfo Krause is a 56 y.o. male presents to [facility] to be seen for right ureteral stone    Patient went to the emergency room last night because he comes in trouble voiding but he has voided couple times since then doing okay currently.  He is currently on Flomax 0.4 mg daily just for the stone.  Voiced that issue normally.    8/21/2021 CT abdomen/pelvis  -7 mm distal right ureteral stone.  4 mm above the right UVJ.  No other stones.  Small fat-containing left inguinal hernia.    no gross hematuria, patient has passed 2 stones before.  No history of lithotripsy    No urologic family history,   Has never had any urologic surgery.    No cardiopulmonary history.  Patient does not smoke.  Patient does not use blood thinner.      8/26/2021 creatinine 1.3, GFR 54    Objective     Past Medical History:   Diagnosis Date   • Hyperlipidemia    • Kidney stones        Past Surgical History:   Procedure Laterality Date   • CHOLECYSTECTOMY     • HAND SURGERY     • VASECTOMY           Current Outpatient Medications:   •  atorvastatin (LIPITOR) 20 MG tablet, Take 20 mg by mouth Daily., Disp: , Rfl:   •  HYDROcodone-acetaminophen (NORCO) 5-325 MG per tablet, Take 1 tablet by mouth Every 6 (Six) Hours As Needed (Pain)., Disp: 15 tablet, Rfl: 0  •  ketorolac (TORADOL) 10 MG tablet, Take 1 tablet by mouth Every 6 (Six) Hours As Needed for Moderate Pain ., Disp: 12 tablet, Rfl: 0  •  ondansetron ODT (ZOFRAN-ODT) 4 MG disintegrating tablet, Place 1 tablet on the tongue Every 8 (Eight) Hours As Needed for Nausea or Vomiting., Disp: 15 tablet, Rfl: 0  •  tamsulosin (FLOMAX) 0.4 MG capsule 24 hr capsule, Take 1 capsule by mouth Daily., Disp: 15 capsule, Rfl: 0  •  thiamine (VITAMIN B-1) 100 MG tablet  tablet, Take 100 mg by mouth Daily., Disp: , Rfl:   •  Vitamin D, Cholecalciferol, (CHOLECALCIFEROL) 10 MCG (400 UNIT) tablet, Take 400 Units  by mouth Daily., Disp: , Rfl:   No current facility-administered medications for this visit.    No Known Allergies     No family history on file.    Social History     Socioeconomic History   • Marital status:      Spouse name: Not on file   • Number of children: Not on file   • Years of education: Not on file   • Highest education level: Not on file   Tobacco Use   • Smoking status: Never Smoker   • Smokeless tobacco: Never Used   Substance and Sexual Activity   • Alcohol use: Yes     Comment: SOCIALLY    • Drug use: Never   • Sexual activity: Defer       Vital Signs:   There were no vitals taken for this visit.     Physical exam    Alert and orient x3  Well appearing, well developed, in no acute distress   Unlabored respirations  Nontender/nondistended  CTAB  RRR    Grossly oriented to person, place and time, judgment is intact, normal mood and affect    Results for orders placed or performed during the hospital encounter of 08/26/21   Comprehensive Metabolic Panel    Specimen: Blood   Result Value Ref Range    Glucose 97 65 - 99 mg/dL    BUN 15 6 - 20 mg/dL    Creatinine 1.37 (H) 0.76 - 1.27 mg/dL    Sodium 141 136 - 145 mmol/L    Potassium 4.2 3.5 - 5.2 mmol/L    Chloride 104 98 - 107 mmol/L    CO2 27.6 22.0 - 29.0 mmol/L    Calcium 9.5 8.6 - 10.5 mg/dL    Total Protein 7.7 6.0 - 8.5 g/dL    Albumin 5.00 3.50 - 5.20 g/dL    ALT (SGPT) 30 1 - 41 U/L    AST (SGOT) 23 1 - 40 U/L    Alkaline Phosphatase 75 39 - 117 U/L    Total Bilirubin 0.8 0.0 - 1.2 mg/dL    eGFR Non African Amer 54 (L) >60 mL/min/1.73    Globulin 2.7 gm/dL    A/G Ratio 1.9 g/dL    BUN/Creatinine Ratio 10.9 7.0 - 25.0    Anion Gap 9.4 5.0 - 15.0 mmol/L   Lipase    Specimen: Blood   Result Value Ref Range    Lipase 43 13 - 60 U/L   Urinalysis With Microscopic If Indicated (No Culture) - Urine, Clean Catch    Specimen: Urine, Clean Catch   Result Value Ref Range    Color, UA Yellow Yellow, Straw    Appearance, UA Clear Clear    pH, UA <=5.0  5.0 - 8.0    Specific Gravity, UA 1.021 1.005 - 1.030    Glucose, UA Negative Negative    Ketones, UA Negative Negative    Bilirubin, UA Negative Negative    Blood, UA Moderate (2+) (A) Negative    Protein, UA Negative Negative    Leuk Esterase, UA Negative Negative    Nitrite, UA Negative Negative    Urobilinogen, UA 1.0 E.U./dL 0.2 - 1.0 E.U./dL   CBC Auto Differential    Specimen: Blood   Result Value Ref Range    WBC 7.23 3.40 - 10.80 10*3/mm3    RBC 5.15 4.14 - 5.80 10*6/mm3    Hemoglobin 15.5 13.0 - 17.7 g/dL    Hematocrit 45.7 37.5 - 51.0 %    MCV 88.7 79.0 - 97.0 fL    MCH 30.1 26.6 - 33.0 pg    MCHC 33.9 31.5 - 35.7 g/dL    RDW 12.6 12.3 - 15.4 %    RDW-SD 41.2 37.0 - 54.0 fl    MPV 10.0 6.0 - 12.0 fL    Platelets 192 140 - 450 10*3/mm3    Neutrophil % 67.9 42.7 - 76.0 %    Lymphocyte % 21.4 19.6 - 45.3 %    Monocyte % 8.6 5.0 - 12.0 %    Eosinophil % 1.1 0.3 - 6.2 %    Basophil % 0.6 0.0 - 1.5 %    Immature Grans % 0.4 0.0 - 0.5 %    Neutrophils, Absolute 4.91 1.70 - 7.00 10*3/mm3    Lymphocytes, Absolute 1.55 0.70 - 3.10 10*3/mm3    Monocytes, Absolute 0.62 0.10 - 0.90 10*3/mm3    Eosinophils, Absolute 0.08 0.00 - 0.40 10*3/mm3    Basophils, Absolute 0.04 0.00 - 0.20 10*3/mm3    Immature Grans, Absolute 0.03 0.00 - 0.05 10*3/mm3    nRBC 0.0 0.0 - 0.2 /100 WBC   Urinalysis, Microscopic Only - Urine, Clean Catch    Specimen: Urine, Clean Catch   Result Value Ref Range    RBC, UA 21-30 (A) None Seen /HPF    WBC, UA 3-5 (A) None Seen /HPF    Bacteria, UA None Seen None Seen /HPF    Squamous Epithelial Cells, UA 0-2 None Seen, 0-2 /HPF    Hyaline Casts, UA 0-2 None Seen /LPF    Methodology Automated Microscopy    Green Top (Gel)   Result Value Ref Range    Extra Tube Hold for add-ons.    Lavender Top   Result Value Ref Range    Extra Tube hold for add-on    Gold Top - SST   Result Value Ref Range    Extra Tube Hold for add-ons.              Assessment and Plan    There are no diagnoses linked to this  encounter.  Records reviewed and summarized in chart    CT images and read reviewed and discussed with the patient    Patient with a distal right ureteral stone, 7 mm.  We discussed that the chance of passing it is only about 30%.  After discussion of risk and benefits he would like stone removal.  We will plan on cystoscopy with right ureteroscopy with laser and right ureteral stent placement.  Risks and benefits were discussed including bleeding, infection and damage to the urinary system.  We also discussed the risk of anesthesia up to and including death.  Patient voiced understanding and would like to proceed.    Patient understands he has a fever greater than 100.5, intractable nausea/vomiting or intractable pain he should go back to emergency room

## 2021-08-27 NOTE — PROGRESS NOTES
Chief Complaint: Urologic complaint    Subjective         History of Present Illness  Adolfo Krause is a 56 y.o. male presents to [facility] to be seen for right ureteral stone    Patient went to the emergency room last night because he comes in trouble voiding but he has voided couple times since then doing okay currently.  He is currently on Flomax 0.4 mg daily just for the stone.  Voiced that issue normally.    8/21/2021 CT abdomen/pelvis  -7 mm distal right ureteral stone.  4 mm above the right UVJ.  No other stones.  Small fat-containing left inguinal hernia.    no gross hematuria, patient has passed 2 stones before.  No history of lithotripsy    No urologic family history,   Has never had any urologic surgery.    No cardiopulmonary history.  Patient does not smoke.  Patient does not use blood thinner.      8/26/2021 creatinine 1.3, GFR 54    Objective     Past Medical History:   Diagnosis Date   • Hyperlipidemia    • Kidney stones        Past Surgical History:   Procedure Laterality Date   • CHOLECYSTECTOMY     • HAND SURGERY     • VASECTOMY           Current Outpatient Medications:   •  atorvastatin (LIPITOR) 20 MG tablet, Take 20 mg by mouth Daily., Disp: , Rfl:   •  HYDROcodone-acetaminophen (NORCO) 5-325 MG per tablet, Take 1 tablet by mouth Every 6 (Six) Hours As Needed (Pain)., Disp: 15 tablet, Rfl: 0  •  ketorolac (TORADOL) 10 MG tablet, Take 1 tablet by mouth Every 6 (Six) Hours As Needed for Moderate Pain ., Disp: 12 tablet, Rfl: 0  •  ondansetron ODT (ZOFRAN-ODT) 4 MG disintegrating tablet, Place 1 tablet on the tongue Every 8 (Eight) Hours As Needed for Nausea or Vomiting., Disp: 15 tablet, Rfl: 0  •  tamsulosin (FLOMAX) 0.4 MG capsule 24 hr capsule, Take 1 capsule by mouth Daily., Disp: 15 capsule, Rfl: 0  No current facility-administered medications for this visit.    No Known Allergies     No family history on file.    Social History     Socioeconomic History   •  Marital status:      Spouse name: Not on file   • Number of children: Not on file   • Years of education: Not on file   • Highest education level: Not on file   Tobacco Use   • Smoking status: Never Smoker   Substance and Sexual Activity   • Alcohol use: Yes     Comment: SOCIALLY    • Drug use: Never   • Sexual activity: Defer       Vital Signs:   There were no vitals taken for this visit.     Physical exam    Alert and orient x3  Well appearing, well developed, in no acute distress   Unlabored respirations  Nontender/nondistended  CTAB  RRR    Grossly oriented to person, place and time, judgment is intact, normal mood and affect    Results for orders placed or performed during the hospital encounter of 08/26/21   Comprehensive Metabolic Panel    Specimen: Blood   Result Value Ref Range    Glucose 97 65 - 99 mg/dL    BUN 15 6 - 20 mg/dL    Creatinine 1.37 (H) 0.76 - 1.27 mg/dL    Sodium 141 136 - 145 mmol/L    Potassium 4.2 3.5 - 5.2 mmol/L    Chloride 104 98 - 107 mmol/L    CO2 27.6 22.0 - 29.0 mmol/L    Calcium 9.5 8.6 - 10.5 mg/dL    Total Protein 7.7 6.0 - 8.5 g/dL    Albumin 5.00 3.50 - 5.20 g/dL    ALT (SGPT) 30 1 - 41 U/L    AST (SGOT) 23 1 - 40 U/L    Alkaline Phosphatase 75 39 - 117 U/L    Total Bilirubin 0.8 0.0 - 1.2 mg/dL    eGFR Non African Amer 54 (L) >60 mL/min/1.73    Globulin 2.7 gm/dL    A/G Ratio 1.9 g/dL    BUN/Creatinine Ratio 10.9 7.0 - 25.0    Anion Gap 9.4 5.0 - 15.0 mmol/L   Lipase    Specimen: Blood   Result Value Ref Range    Lipase 43 13 - 60 U/L   Urinalysis With Microscopic If Indicated (No Culture) - Urine, Clean Catch    Specimen: Urine, Clean Catch   Result Value Ref Range    Color, UA Yellow Yellow, Straw    Appearance, UA Clear Clear    pH, UA <=5.0 5.0 - 8.0    Specific Gravity, UA 1.021 1.005 - 1.030    Glucose, UA Negative Negative    Ketones, UA Negative Negative    Bilirubin, UA Negative Negative    Blood, UA Moderate (2+) (A) Negative    Protein, UA Negative Negative     Leuk Esterase, UA Negative Negative    Nitrite, UA Negative Negative    Urobilinogen, UA 1.0 E.U./dL 0.2 - 1.0 E.U./dL   CBC Auto Differential    Specimen: Blood   Result Value Ref Range    WBC 7.23 3.40 - 10.80 10*3/mm3    RBC 5.15 4.14 - 5.80 10*6/mm3    Hemoglobin 15.5 13.0 - 17.7 g/dL    Hematocrit 45.7 37.5 - 51.0 %    MCV 88.7 79.0 - 97.0 fL    MCH 30.1 26.6 - 33.0 pg    MCHC 33.9 31.5 - 35.7 g/dL    RDW 12.6 12.3 - 15.4 %    RDW-SD 41.2 37.0 - 54.0 fl    MPV 10.0 6.0 - 12.0 fL    Platelets 192 140 - 450 10*3/mm3    Neutrophil % 67.9 42.7 - 76.0 %    Lymphocyte % 21.4 19.6 - 45.3 %    Monocyte % 8.6 5.0 - 12.0 %    Eosinophil % 1.1 0.3 - 6.2 %    Basophil % 0.6 0.0 - 1.5 %    Immature Grans % 0.4 0.0 - 0.5 %    Neutrophils, Absolute 4.91 1.70 - 7.00 10*3/mm3    Lymphocytes, Absolute 1.55 0.70 - 3.10 10*3/mm3    Monocytes, Absolute 0.62 0.10 - 0.90 10*3/mm3    Eosinophils, Absolute 0.08 0.00 - 0.40 10*3/mm3    Basophils, Absolute 0.04 0.00 - 0.20 10*3/mm3    Immature Grans, Absolute 0.03 0.00 - 0.05 10*3/mm3    nRBC 0.0 0.0 - 0.2 /100 WBC   Urinalysis, Microscopic Only - Urine, Clean Catch    Specimen: Urine, Clean Catch   Result Value Ref Range    RBC, UA 21-30 (A) None Seen /HPF    WBC, UA 3-5 (A) None Seen /HPF    Bacteria, UA None Seen None Seen /HPF    Squamous Epithelial Cells, UA 0-2 None Seen, 0-2 /HPF    Hyaline Casts, UA 0-2 None Seen /LPF    Methodology Automated Microscopy    Green Top (Gel)   Result Value Ref Range    Extra Tube Hold for add-ons.    Lavender Top   Result Value Ref Range    Extra Tube hold for add-on    Gold Top - SST   Result Value Ref Range    Extra Tube Hold for add-ons.              Assessment and Plan    Diagnoses and all orders for this visit:    1. Benign prostatic hyperplasia (BPH) with straining on urination (Primary)    2. Ureteral stone      Records reviewed and summarized in chart    CT images and read reviewed and discussed with the patient    Patient with a distal  right ureteral stone, 7 mm.  We discussed that the chance of passing it is only about 30%.  After discussion of risk and benefits he would like stone removal.  We will plan on cystoscopy with right ureteroscopy with laser and right ureteral stent placement.  Risks and benefits were discussed including bleeding, infection and damage to the urinary system.  We also discussed the risk of anesthesia up to and including death.  Patient voiced understanding and would like to proceed.    Patient understands he has a fever greater than 100.5, intractable nausea/vomiting or intractable pain he should go back to emergency room         Patient came in on day of surgery bringing his stone that he had passed that morning.  He showed me a 7 mm stone and after reviewing his films I was the only stone he had in the ureter.  He was doing better and pain-free.  Ureteroscopy was canceled.  Patient was counseled on kidney stone prevention    After discussion he will follow-up as needed

## 2021-08-30 LAB — SARS-COV-2 RNA RESP QL NAA+PROBE: NOT DETECTED

## 2021-09-01 ENCOUNTER — HOSPITAL ENCOUNTER (OUTPATIENT)
Facility: HOSPITAL | Age: 56
Discharge: HOME | End: 2021-09-01
Attending: UROLOGY | Admitting: UROLOGY

## 2021-09-01 VITALS
DIASTOLIC BLOOD PRESSURE: 68 MMHG | HEIGHT: 68 IN | WEIGHT: 201.06 LBS | BODY MASS INDEX: 30.47 KG/M2 | TEMPERATURE: 96.8 F | OXYGEN SATURATION: 98 % | SYSTOLIC BLOOD PRESSURE: 109 MMHG | RESPIRATION RATE: 18 BRPM | HEART RATE: 74 BPM

## 2021-09-01 DIAGNOSIS — N20.1 URETERAL STONE: ICD-10-CM

## 2021-09-01 LAB — QT INTERVAL: 383 MS

## 2021-09-01 PROCEDURE — 93010 ELECTROCARDIOGRAM REPORT: CPT | Performed by: INTERNAL MEDICINE

## 2021-09-01 PROCEDURE — 93005 ELECTROCARDIOGRAM TRACING: CPT | Performed by: ANESTHESIOLOGY

## 2021-09-01 PROCEDURE — G0463 HOSPITAL OUTPT CLINIC VISIT: HCPCS | Performed by: UROLOGY

## 2021-09-01 RX ORDER — SODIUM CHLORIDE 0.9 % (FLUSH) 0.9 %
10 SYRINGE (ML) INJECTION AS NEEDED
Status: DISCONTINUED | OUTPATIENT
Start: 2021-09-01 | End: 2021-09-02 | Stop reason: HOSPADM

## 2021-09-01 RX ORDER — LEVOFLOXACIN 5 MG/ML
500 INJECTION, SOLUTION INTRAVENOUS ONCE
Status: DISCONTINUED | OUTPATIENT
Start: 2021-09-01 | End: 2021-09-02 | Stop reason: HOSPADM

## 2021-09-01 RX ORDER — SODIUM CHLORIDE 9 MG/ML
100 INJECTION, SOLUTION INTRAVENOUS CONTINUOUS
Status: DISCONTINUED | OUTPATIENT
Start: 2021-09-01 | End: 2021-09-02 | Stop reason: HOSPADM

## 2021-09-01 RX ORDER — SODIUM CHLORIDE 0.9 % (FLUSH) 0.9 %
3 SYRINGE (ML) INJECTION EVERY 12 HOURS SCHEDULED
Status: DISCONTINUED | OUTPATIENT
Start: 2021-09-01 | End: 2021-09-02 | Stop reason: HOSPADM

## 2021-09-01 NOTE — NURSING NOTE
RN WENT TO ADMIT PT. NOTED SMALL STONE IN CUP PT. BROUGHT FROM HOME. DR. JOHNSON NOTIFIED AND SURGERY CANCELLED.

## 2021-09-02 PROBLEM — R39.16 BENIGN PROSTATIC HYPERPLASIA (BPH) WITH STRAINING ON URINATION: Status: RESOLVED | Noted: 2021-08-27 | Resolved: 2021-09-02

## 2021-09-02 PROBLEM — N20.1 URETERAL STONE: Status: RESOLVED | Noted: 2021-08-27 | Resolved: 2021-09-02

## 2021-09-02 PROBLEM — N40.1 BENIGN PROSTATIC HYPERPLASIA (BPH) WITH STRAINING ON URINATION: Status: RESOLVED | Noted: 2021-08-27 | Resolved: 2021-09-02

## 2021-09-02 NOTE — DISCHARGE SUMMARY
[facility]         DISCHARGE SUMMARY    Patient Name: Adolfo Krause  : 1965  MRN: [id number]    Date of Admission: 2021  Date of Discharge:  21   Primary Care Physician: Amena Rosa APRN    Consults     Date and Time Order Name Status Description    2021  9:36 PM IP General Consult (Use specialty-specific consult if known) Completed           Surgical Procedures Since Admission: No procedures    Presenting Problem:   Ureteral stone [N20.1]  Nephrolithiasis [N20.0]    Active and Resolved Hospital Problems:  Active Hospital Problems    Diagnosis POA   • Nephrolithiasis [N20.0] Yes      Resolved Hospital Problems    Diagnosis POA   • **Ureteral stone [N20.1] Unknown         Hospital Course     Hospital Course:  Adolfo Krause is a 56 y.o. male   Patient came in and brought his stone, he had passed it about a hour before he came to the hospital.  He was a to be discharged home with no procedure doing well    Day of Discharge     Vital Signs:     Output by Drain (mL) 21 0701 - 21 1900 21 1901 - 21 0700 21 0701 - 21 1109 Range Total   Patient has no LDAs of requested type attached.         Pertinent  and/or Most Recent Results     LAB RESULTS:      Lab 21  1757   WBC 7.23   HEMOGLOBIN 15.5   HEMATOCRIT 45.7   PLATELETS 192   NEUTROS ABS 4.91   IMMATURE GRANS (ABS) 0.03   LYMPHS ABS 1.55   MONOS ABS 0.62   EOS ABS 0.08   MCV 88.7         Lab 21  1757   SODIUM 141   POTASSIUM 4.2   CHLORIDE 104   CO2 27.6   ANION GAP 9.4   BUN 15   CREATININE 1.37*   GLUCOSE 97   CALCIUM 9.5         Lab 21  1757   TOTAL PROTEIN 7.7   ALBUMIN 5.00   GLOBULIN 2.7   ALT (SGPT) 30   AST (SGOT) 23   BILIRUBIN 0.8   ALK PHOS 75   LIPASE 43             Brief Urine Lab Results  (Last result in the past 365 days)      Color   Clarity   Blood   Leuk Est   Nitrite   Protein   CREAT   Urine HCG        21 1757 Yellow Clear Moderate (2+) Negative  Negative Negative             Microbiology Results (last 10 days)     Procedure Component Value - Date/Time    COVID PRE-OP / PRE-PROCEDURE SCREENING ORDER (NO ISOLATION) - Swab, Nasopharynx [[id number]]  (Normal) Collected: 08/27/21 0836    Lab Status: Final result Specimen: Swab from Nasopharynx Updated: 08/30/21 0204    Narrative:      The following orders were created for panel order COVID PRE-OP / PRE-PROCEDURE SCREENING ORDER (NO ISOLATION) - Swab, Nasopharynx.  Procedure                               Abnormality         Status                     ---------                               -----------         ------                     COVID-19,CEPHEID/GONZALEZ/BD...[[id number]]  Normal              Final result                 Please view results for these tests on the individual orders.    COVID-19,CEPHEID/GONZALEZ/BDMAX,COR/AGUSTIN/PAD/[facility] IN-HOUSE(OR EMERGENT/ADD-ON),NP SWAB IN TRANSPORT MEDIA 3-4 HR TAT, RT-PCR - Swab, Nasopharynx [[id number]]  (Normal) Collected: 08/27/21 0836    Lab Status: Final result Specimen: Swab from Nasopharynx Updated: 08/30/21 0204     COVID19 Not Detected    Narrative:      Fact sheet for providers: https://www.fda.gov/media/070543/download     Fact sheet for patients: https://www.fda.gov/media/397898/download  Fact sheet for providers: https://www.fda.gov/media/587379/download     Fact sheet for patients: https://www.fda.gov/media/101618/download  Fact sheet for providers: https://www.fda.gov/media/629830/download     Fact sheet for patients: https://www.fda.gov/media/352778/download           Labs Pending at Discharge:      Discharge Details        Discharge Medications      Changes to Medications      Instructions Start Date   tamsulosin 0.4 MG capsule 24 hr capsule  Commonly known as: FLOMAX  What changed: when to take this   0.4 mg, Oral, Daily         Continue These Medications      Instructions Start Date   atorvastatin 20 MG tablet  Commonly known as: LIPITOR   20 mg, Oral, Nightly       HYDROcodone-acetaminophen 5-325 MG per tablet  Commonly known as: NORCO   1 tablet, Oral, Every 6 Hours PRN      ketorolac 10 MG tablet  Commonly known as: TORADOL   10 mg, Oral, Every 6 Hours PRN      ondansetron ODT 4 MG disintegrating tablet  Commonly known as: ZOFRAN-ODT   4 mg, Translingual, Every 8 Hours PRN      thiamine 100 MG tablet  tablet  Commonly known as: VITAMIN B-1   100 mg, Oral, Daily      vitamin B-12 2500 MCG sublingual tablet tablet  Commonly known as: CYANOCOBALAMIN   5,000 mcg, Sublingual, Daily, Takes 2 tabs       Vitamin D (Cholecalciferol) 10 MCG (400 UNIT) tablet  Commonly known as: CHOLECALCIFEROL   400 Units, Oral, Daily             No Known Allergies    Condition:    Good, stable    Physical exam:    Constitutional: Well-appearing, well-developed,  in no acute distress    Respiratory: Unlabored breathing    Abdominal: Nontender, nondistended, no rigidity or guarding, no hepatosplenomegaly    Genitourinary:     Bladder nonpalpable     Neurologic: Grossly oriented to person, place and time, judgment and insight intact, normal mood and affect          Discharge Disposition:  Home or Self Care    Diet:  Hospital:No active diet order      Discharge Activity:       No future appointments.      Follow-up as needed

## 2022-06-24 ENCOUNTER — OFFICE VISIT (OUTPATIENT)
Dept: ORTHOPEDIC SURGERY | Facility: CLINIC | Age: 57
End: 2022-06-24

## 2022-06-24 VITALS — BODY MASS INDEX: 30.81 KG/M2 | HEART RATE: 82 BPM | OXYGEN SATURATION: 96 % | HEIGHT: 69 IN | WEIGHT: 208 LBS

## 2022-06-24 DIAGNOSIS — M19.011 ARTHRITIS OF RIGHT ACROMIOCLAVICULAR JOINT: Primary | ICD-10-CM

## 2022-06-24 DIAGNOSIS — M67.911 TENDINOPATHY OF RIGHT ROTATOR CUFF: ICD-10-CM

## 2022-06-24 PROCEDURE — 99203 OFFICE O/P NEW LOW 30 MIN: CPT | Performed by: STUDENT IN AN ORGANIZED HEALTH CARE EDUCATION/TRAINING PROGRAM

## 2022-06-24 RX ORDER — METFORMIN HYDROCHLORIDE 500 MG/1
500 TABLET, EXTENDED RELEASE ORAL 2 TIMES DAILY
COMMUNITY
Start: 2022-06-07

## 2022-06-24 NOTE — PROGRESS NOTES
"Chief Complaint  Initial Evaluation of the Right Shoulder    Subjective          Adolfo Krause presents to [facility] for   History of Present Illness    Adolfo presents today for evaluation of his right shoulder.  He describes an off-and-on history of right shoulder pain dating back for several years.  He had a previous subacromial injection for rotator cuff tendinopathy and did well with that.  He describes an aching sensation in the shoulder at times.  He has pain radiating down the arm with bowling.  He describes a loss of  strength at times.  He describes stiffness primarily with internal rotation.  He denies any numbness at this time.  No prior shoulder surgeries.    No Known Allergies     Social History     Socioeconomic History   • Marital status:    Tobacco Use   • Smoking status: Never Smoker   • Smokeless tobacco: Never Used   Vaping Use   • Vaping Use: Never used   Substance and Sexual Activity   • Alcohol use: Never     Comment: SOCIALLY    • Drug use: Never   • Sexual activity: Defer        I reviewed the patient's chief complaint, history of present illness, review of systems, past medical history, surgical history, family history, social history, medications, and allergy list.     REVIEW OF SYSTEMS    Constitutional: Denies fevers, chills, weight loss  Cardiovascular: Denies chest pain, shortness of breath  Skin: Denies rashes, acute skin changes  Neurologic: Denies headache, loss of consciousness  MSK: Right shoulder pain      Objective   Vital Signs:   Pulse 82   Ht 174 cm (68.5\")   Wt 94.3 kg (208 lb)   SpO2 96%   BMI 31.17 kg/m²     Body mass index is 31.17 kg/m².    Physical Exam    General: Alert. No acute distress.   Right upper extremity: No areas of tenderness about the shoulder.  Active elevation to 125 degrees, passive elevation 180 degrees.  External rotation 45 degrees.  Internal rotation to the SI joint.  5 out of 5 with rotator cuff " testing with no pain.  No pain with cross arm abduction.  No pain with speeds testing.  No pain with [employer] testing.  Distal neurovascular is intact.    Procedures    Imaging Results (Most Recent)     None                   Assessment and Plan        No results found.     Diagnoses and all orders for this visit:    1. Arthritis of right acromioclavicular joint (Primary)  -     Ambulatory Referral to Physical Therapy Evaluate and treat, Ortho; Heat, Electrotherapy; Moist heat, Ultrasound, Phonophoresis; Iontophoresis, E-stim; Stretching, ROM, Strengthening    2. Tendinopathy of right rotator cuff  -     Ambulatory Referral to Physical Therapy Evaluate and treat, Ortho; Heat, Electrotherapy; Moist heat, Ultrasound, Phonophoresis; Iontophoresis, E-stim; Stretching, ROM, Strengthening        I reviewed the MRI with Mr. Krause.  He has rotator cuff tendinopathy but no tears.  He does have AC joint arthritis and questionable posterior labral tearing.  He is not getting any mechanical symptoms in his shoulder and his exam is most notable for stiffness.  We discussed physical therapy and anti-inflammatory medications.  These were provided for him today.  He will follow-up with me in 6 weeks for reevaluation.  No x-rays needed when he returns.      Call or return if worsening symptoms.    Scribed for Harsha Ndiaye MD by Harsha Ndiaye MD  06/24/2022   08:27 EDT         Follow Up   Return in about 6 weeks (around 8/5/2022).  Patient was given instructions and counseling regarding his condition or for health maintenance advice. Please see specific information pulled into the AVS if appropriate.       I have personally performed the services described in this document as scribed by the above individual and it is both accurate and complete.     Harsha Ndiaye MD  06/24/22  08:29 EDT

## 2022-08-05 ENCOUNTER — OFFICE VISIT (OUTPATIENT)
Dept: ORTHOPEDIC SURGERY | Facility: CLINIC | Age: 57
End: 2022-08-05

## 2022-08-05 VITALS — BODY MASS INDEX: 31.7 KG/M2 | HEART RATE: 65 BPM | OXYGEN SATURATION: 95 % | HEIGHT: 69 IN | WEIGHT: 214 LBS

## 2022-08-05 DIAGNOSIS — M19.011 ARTHRITIS OF RIGHT ACROMIOCLAVICULAR JOINT: Primary | ICD-10-CM

## 2022-08-05 DIAGNOSIS — M67.911 TENDINOPATHY OF RIGHT ROTATOR CUFF: ICD-10-CM

## 2022-08-05 PROCEDURE — 99213 OFFICE O/P EST LOW 20 MIN: CPT | Performed by: STUDENT IN AN ORGANIZED HEALTH CARE EDUCATION/TRAINING PROGRAM

## 2022-08-05 NOTE — PROGRESS NOTES
"Chief Complaint  Follow-up and Pain of the Right Shoulder    Subjective          Adolfo Krause presents to [facility] for   History of Present Illness    Adolfo returns for follow-up of his right shoulder.  He has right shoulder AC arthritis, rotator cuff tendinopathy, and stiffness that we have been treating with physical therapy.  He feels improvement with his PT work.  He denies any pain at rest.  He does have persistent pain with activity.  He is not having pain at night.  He is not taking any regular medication at this time.  He feels his motion is improving.  He has yet to return to bowling which will be the ultimate test for his shoulder he feels.    No Known Allergies     Social History     Socioeconomic History   • Marital status:    Tobacco Use   • Smoking status: Never Smoker   • Smokeless tobacco: Never Used   Vaping Use   • Vaping Use: Never used   Substance and Sexual Activity   • Alcohol use: Never     Comment: SOCIALLY    • Drug use: Never   • Sexual activity: Defer        I reviewed the patient's chief complaint, history of present illness, review of systems, past medical history, surgical history, family history, social history, medications, and allergy list.     REVIEW OF SYSTEMS    Constitutional: Denies fevers, chills, weight loss  Cardiovascular: Denies chest pain, shortness of breath  Skin: Denies rashes, acute skin changes  Neurologic: Denies headache, loss of consciousness  MSK: Right shoulder pain      Objective   Vital Signs:   Pulse 65   Ht 174 cm (68.5\")   Wt 97.1 kg (214 lb)   SpO2 95%   BMI 32.07 kg/m²     Body mass index is 32.07 kg/m².    Physical Exam    General: Alert. No acute distress.   Right upper extremity: No areas of tenderness today.  No swelling or bruising.  No wounds.  Active elevation 170 degrees, external rotation 45 degrees, internal rotation to the waistline.  5 out of 5 with rotator cuff testing with no pain.  No pain with " impingement testing.  No pain with [employer] testing.  No pain with speeds testing today.  Distal neurovascular is intact.    Procedures    Imaging Results (Most Recent)     None                   Assessment and Plan        No results found.     Diagnoses and all orders for this visit:    1. Arthritis of right acromioclavicular joint (Primary)    2. Tendinopathy of right rotator cuff        Adolfo has right shoulder AC arthritis, rotator cuff tendinopathy, and stiffness that we are treating with physical therapy at this time.  His motion is improved today.  His pain is improved as well.  He will continue physical therapy.  He will continue his home exercises well.  He will begin bowling in September.  Follow-up with me in 6 weeks for reevaluation.  We may consider a repeat injection if needed.  No x-rays needed when he returns.        Call or return if worsening symptoms.    Scribed for Harsha Ndiaye MD by Harhsa Ndiaye MD  08/05/2022   08:12 EDT         Follow Up   Return in about 6 weeks (around 9/16/2022).  Patient was given instructions and counseling regarding his condition or for health maintenance advice. Please see specific information pulled into the AVS if appropriate.       I have personally performed the services described in this document as scribed by the above individual and it is both accurate and complete.     Harsha Ndiaye MD  08/05/22  08:13 EDT

## 2022-09-23 ENCOUNTER — OFFICE VISIT (OUTPATIENT)
Dept: ORTHOPEDIC SURGERY | Facility: CLINIC | Age: 57
End: 2022-09-23

## 2022-09-23 VITALS — HEIGHT: 69 IN | BODY MASS INDEX: 30.66 KG/M2 | WEIGHT: 207 LBS | OXYGEN SATURATION: 96 % | HEART RATE: 75 BPM

## 2022-09-23 DIAGNOSIS — M67.911 TENDINOPATHY OF RIGHT ROTATOR CUFF: ICD-10-CM

## 2022-09-23 DIAGNOSIS — M19.011 ARTHRITIS OF RIGHT ACROMIOCLAVICULAR JOINT: Primary | ICD-10-CM

## 2022-09-23 PROCEDURE — 99213 OFFICE O/P EST LOW 20 MIN: CPT | Performed by: STUDENT IN AN ORGANIZED HEALTH CARE EDUCATION/TRAINING PROGRAM

## 2022-09-23 NOTE — PROGRESS NOTES
"Chief Complaint  Pain and Follow-up of the Right Shoulder    Subjective          Adolfo Krause presents to [facility] for   History of Present Illness    Adolfo returns for follow-up of his right shoulder.  He has AC arthritis, rotator cuff tendinopathy, and stiffness that we have been treating conservatively.  He feels 70% improved with physical therapy.  He still has pain at the end of his bowling matches.  He gets numbness in the hand around the same time.  He describes this is all 5 fingers.  He is not taking any medication currently.    No Known Allergies     Social History     Socioeconomic History   • Marital status:    Tobacco Use   • Smoking status: Never Smoker   • Smokeless tobacco: Never Used   Vaping Use   • Vaping Use: Never used   Substance and Sexual Activity   • Alcohol use: Never     Comment: SOCIALLY    • Drug use: Never   • Sexual activity: Defer        I reviewed the patient's chief complaint, history of present illness, review of systems, past medical history, surgical history, family history, social history, medications, and allergy list.     REVIEW OF SYSTEMS    Constitutional: Denies fevers, chills, weight loss  Cardiovascular: Denies chest pain, shortness of breath  Skin: Denies rashes, acute skin changes  Neurologic: Denies headache, loss of consciousness  MSK: Right shoulder pain      Objective   Vital Signs:   Pulse 75   Ht 174 cm (68.5\")   Wt 93.9 kg (207 lb)   SpO2 96%   BMI 31.02 kg/m²     Body mass index is 31.02 kg/m².    Physical Exam    General: Alert. No acute distress.   Right upper extremity: No areas of tenderness.  Active elevation 160 degrees, external rotation 45 degrees, internal rotation to the lower lumbar spine.  5 out of 5 with rotator cuff testing and no pain.  No pain with impingement testing.  Full active finger range of motion without triggering.  Sensation currently intact in the median, radial, ulnar nerve distribution. "  Negative Tinel's and Phalen's compression across the cubital and carpal tunnels.  Palpable radial pulse.  No muscle atrophy.    Procedures    Imaging Results (Most Recent)     None                   Assessment and Plan        No results found.     Diagnoses and all orders for this visit:    1. Arthritis of right acromioclavicular joint (Primary)    2. Tendinopathy of right rotator cuff        Adolfo shoulder motion and overall function are improving with physical therapy.  He does have some numbness that is becoming more prominent primarily activity related.  We will continue to monitor.  He will continue formal physical therapy and they will add neck exercises as well.  We discussed Aleve twice daily as needed.  Follow-up in 6 weeks for reevaluation.  No x-rays needed when he returns.  We may consider an EMG if the numbness persists.      Call or return if worsening symptoms.    Scribed for Harsha Ndiaye MD by Rody Junior MA  09/23/2022   08:24 EDT         Follow Up   Return in about 6 weeks (around 11/4/2022).  Patient was given instructions and counseling regarding his condition or for health maintenance advice. Please see specific information pulled into the AVS if appropriate.       I have personally performed the services described in this document as scribed by the above individual and it is both accurate and complete.     Harsha Ndiaye MD  09/23/22  08:47 EDT

## 2022-11-04 ENCOUNTER — OFFICE VISIT (OUTPATIENT)
Dept: ORTHOPEDIC SURGERY | Facility: CLINIC | Age: 57
End: 2022-11-04

## 2022-11-04 VITALS — HEIGHT: 69 IN | BODY MASS INDEX: 30.21 KG/M2 | WEIGHT: 204 LBS | HEART RATE: 75 BPM | OXYGEN SATURATION: 97 %

## 2022-11-04 DIAGNOSIS — M67.911 TENDINOPATHY OF RIGHT ROTATOR CUFF: ICD-10-CM

## 2022-11-04 DIAGNOSIS — M19.011 ARTHRITIS OF RIGHT ACROMIOCLAVICULAR JOINT: ICD-10-CM

## 2022-11-04 DIAGNOSIS — R29.898 RIGHT HAND WEAKNESS: Primary | ICD-10-CM

## 2022-11-04 PROCEDURE — 99213 OFFICE O/P EST LOW 20 MIN: CPT | Performed by: STUDENT IN AN ORGANIZED HEALTH CARE EDUCATION/TRAINING PROGRAM

## 2022-11-04 NOTE — PROGRESS NOTES
"Chief Complaint  Follow-up and Pain of the Right Shoulder    Subjective          Adolfo Krause presents to [facility] for   History of Present Illness    Adolfo returns for evaluation of his right shoulder and right hand.  He has been doing physical therapy for his right shoulder AC arthritis and rotator cuff tendinopathy.  He feels significant improvement in the shoulder.  He has no pain at rest or with activity.  He is very happy with his PT progress and has another 1 month scheduled.  His main current complaint today is weakness in his right hand with activity.  He struggles with bowling, operating a leaf blower, or operating a chainsaw.  He is interested in further evaluation for the hand.      No Known Allergies     Social History     Socioeconomic History   • Marital status:    Tobacco Use   • Smoking status: Never   • Smokeless tobacco: Never   Vaping Use   • Vaping Use: Never used   Substance and Sexual Activity   • Alcohol use: Never     Comment: SOCIALLY    • Drug use: Never   • Sexual activity: Defer        I reviewed the patient's chief complaint, history of present illness, review of systems, past medical history, surgical history, family history, social history, medications, and allergy list.     REVIEW OF SYSTEMS    Constitutional: Denies fevers, chills, weight loss  Cardiovascular: Denies chest pain, shortness of breath  Skin: Denies rashes, acute skin changes  Neurologic: Denies headache, loss of consciousness  MSK: Right shoulder pain, right hand weakness      Objective   Vital Signs:   Pulse 75   Ht 174 cm (68.5\")   Wt 92.5 kg (204 lb)   SpO2 97%   BMI 30.57 kg/m²     Body mass index is 30.57 kg/m².    Physical Exam    General: Alert. No acute distress.   Right upper extremity: No areas of tenderness.  Active elevation 180 degrees, external rotation to 45 degrees, internal rotation to the lower lumbar spine.  5 out of 5 with rotator cuff testing with no " pain.  No pain with impingement testing.  No pain with speeds testing.  Full elbow, wrist, finger range of motion.  Negative Phalen's compression test.  Sensation is intact.  Palpable radial pulse.    Procedures    Imaging Results (Most Recent)     None                   Assessment and Plan        No results found.     Diagnoses and all orders for this visit:    1. Right hand weakness (Primary)  -     EMG & Nerve Conduction Test; Future    2. Arthritis of right acromioclavicular joint    3. Tendinopathy of right rotator cuff        We discussed treatment options.  He will continue physical therapy for his neck and shoulder as he has had significant improvement and is happy with his progress.  We will evaluate his right upper extremity weakness with an EMG.  An order was placed today.  Follow-up after EMG to discuss results and additional treatment options.      Call or return if worsening symptoms.    Scribed for aHrsha Ndiaye MD by Hilary Truong  11/04/2022   08:35 EDT         Follow Up       EMG results    Patient was given instructions and counseling regarding his condition or for health maintenance advice. Please see specific information pulled into the AVS if appropriate.       I have personally performed the services described in this document as scribed by the above individual and it is both accurate and complete.     Harsha Ndiaye MD  11/04/22  09:02 EDT

## 2022-12-01 DIAGNOSIS — R29.898 RIGHT HAND WEAKNESS: ICD-10-CM

## 2022-12-16 ENCOUNTER — OFFICE VISIT (OUTPATIENT)
Dept: ORTHOPEDIC SURGERY | Facility: CLINIC | Age: 57
End: 2022-12-16

## 2022-12-16 VITALS — HEIGHT: 68 IN | BODY MASS INDEX: 33.04 KG/M2 | WEIGHT: 218 LBS

## 2022-12-16 DIAGNOSIS — M67.911 TENDINOPATHY OF RIGHT ROTATOR CUFF: ICD-10-CM

## 2022-12-16 DIAGNOSIS — R29.898 RIGHT HAND WEAKNESS: ICD-10-CM

## 2022-12-16 DIAGNOSIS — M19.011 ARTHRITIS OF RIGHT ACROMIOCLAVICULAR JOINT: Primary | ICD-10-CM

## 2022-12-16 PROCEDURE — 99213 OFFICE O/P EST LOW 20 MIN: CPT | Performed by: PHYSICIAN ASSISTANT

## 2022-12-16 RX ORDER — ATORVASTATIN CALCIUM 40 MG/1
40 TABLET, FILM COATED ORAL
COMMUNITY
Start: 2022-12-05

## 2022-12-16 NOTE — PROGRESS NOTES
"Chief Complaint  Follow-up of the Right Hand and Follow-up of the Right Forearm    Subjective          Adolfo Krause presents to [facility]   History of Present Illness    Adolfo Krause presents today for a follow-up of his right hand and right forearm.  Today, patient states he is doing well.  He recently had a EMG and presents to review the results.  Patient states that his shoulder is doing great.  He reports no pain to his shoulder and an improved range of motion.  He states his numbness and weakness to his right hand has resolved.  He has continued bowling without complications or concerns.  He has finished formal physical therapy and continues to do his home exercises.  He is not having to take any medication for his shoulder.  He denies new injuries.    No Known Allergies     Social History     Socioeconomic History   • Marital status:    Tobacco Use   • Smoking status: Never   • Smokeless tobacco: Never   Vaping Use   • Vaping Use: Never used   Substance and Sexual Activity   • Alcohol use: Never     Comment: SOCIALLY    • Drug use: Never   • Sexual activity: Defer        I reviewed the patient's chief complaint, history of present illness, review of systems, past medical history, surgical history, family history, social history, medications, and allergy list.     REVIEW OF SYSTEMS    Constitutional: Denies fevers, chills, weight loss  Cardiovascular: Denies chest pain, shortness of breath  Skin: Denies rashes, acute skin changes  Neurologic: Denies headache, loss of consciousness  MSK: Right hand pain.  Right shoulder pain      Objective   Vital Signs:   Ht 172.7 cm (68\")   Wt 98.9 kg (218 lb)   BMI 33.15 kg/m²     Body mass index is 33.15 kg/m².    Physical Exam    General: Alert. No acute distress.   Right upper extremity: Nontender to the AC joint.  Nontender to the bicipital groove.  Nontender to the posterior shoulder.  Active forward elevation 180 degrees.  " External rotation 45 degrees.  Internal rotation to the mid lumbar spine.  5 out of 5 rotator cuff testing with no pain.  Negative impingement testing.  Full elbow range of motion.  Active wrist range of motion.  Forearm soft.  Thumb opposition intact.  Palmar abduction of the thumb intact.  Sensation intact axillary, median, radial, and ulnar nerve distributions.  Palpable radial pulse.    Procedures    Imaging Results (Most Recent)     None                 Assessment and Plan    Diagnoses and all orders for this visit:    1. Arthritis of right acromioclavicular joint (Primary)    2. Tendinopathy of right rotator cuff    3. Right hand weakness        Adolfo Krause presents today for follow-up of his right shoulder arthritis and right hand numbness and weakness.  Patient recently had an EMG that showed a normal study of his right upper extremity.  Patient is doing well with good range of motion and strength.  His numbness and tingling has resolved.  Patient will continue with his home exercises to maintain his shoulder range of motion and strength.  Continue with activities as tolerated.      Patient will follow up as needed.      Call or return if symptoms worsen or patient has any concerns.       Follow Up   Return if symptoms worsen or fail to improve.  Patient was given instructions and counseling regarding his condition or for health maintenance advice. Please see specific information pulled into the AVS if appropriate.     Joselyn Campbell PA-C  12/16/22  08:27 EST

## (undated) DEVICE — SYS IRR PUMP SGL ACTN VAC SYR 10CC

## (undated) DEVICE — CYSTO PACK: Brand: MEDLINE INDUSTRIES, INC.

## (undated) DEVICE — TRY PREP SCRB VAG PVP

## (undated) DEVICE — BASIC SINGLE BASIN-LF: Brand: MEDLINE INDUSTRIES, INC.

## (undated) DEVICE — Y-TYPE TUR/BLADDER IRRIGATION SET, REGULATING CLAMP

## (undated) DEVICE — GW SUREGLIDE FLXTIP ANG/TP .038 3X150CM

## (undated) DEVICE — TOWEL,OR,DSP,ST,BLUE,STD,4/PK,20PK/CS: Brand: MEDLINE

## (undated) DEVICE — GLV SURG SENSICARE SLT PF LF 8 STRL

## (undated) DEVICE — SOL IRR NACL 0.9PCT 3000ML

## (undated) DEVICE — GW PTFE/COAT STR/TP STFF/BDY .038 150CM STRL

## (undated) DEVICE — Device